# Patient Record
Sex: FEMALE | Race: BLACK OR AFRICAN AMERICAN | NOT HISPANIC OR LATINO
[De-identification: names, ages, dates, MRNs, and addresses within clinical notes are randomized per-mention and may not be internally consistent; named-entity substitution may affect disease eponyms.]

---

## 2019-07-31 ENCOUNTER — RESULT REVIEW (OUTPATIENT)
Age: 34
End: 2019-07-31

## 2020-06-09 ENCOUNTER — TRANSCRIPTION ENCOUNTER (OUTPATIENT)
Age: 35
End: 2020-06-09

## 2020-08-19 PROBLEM — Z00.00 ENCOUNTER FOR PREVENTIVE HEALTH EXAMINATION: Status: ACTIVE | Noted: 2020-08-19

## 2020-08-24 ENCOUNTER — APPOINTMENT (OUTPATIENT)
Dept: ANTEPARTUM | Facility: CLINIC | Age: 35
End: 2020-08-24
Payer: COMMERCIAL

## 2020-08-24 ENCOUNTER — ASOB RESULT (OUTPATIENT)
Age: 35
End: 2020-08-24

## 2020-08-24 PROCEDURE — 76811 OB US DETAILED SNGL FETUS: CPT

## 2020-09-22 ENCOUNTER — ASOB RESULT (OUTPATIENT)
Age: 35
End: 2020-09-22

## 2020-09-22 ENCOUNTER — APPOINTMENT (OUTPATIENT)
Dept: ANTEPARTUM | Facility: CLINIC | Age: 35
End: 2020-09-22
Payer: COMMERCIAL

## 2020-09-22 PROCEDURE — 76816 OB US FOLLOW-UP PER FETUS: CPT

## 2020-09-25 ENCOUNTER — APPOINTMENT (OUTPATIENT)
Dept: ANTEPARTUM | Facility: CLINIC | Age: 35
End: 2020-09-25

## 2020-10-27 ENCOUNTER — ASOB RESULT (OUTPATIENT)
Age: 35
End: 2020-10-27

## 2020-10-27 ENCOUNTER — APPOINTMENT (OUTPATIENT)
Dept: ANTEPARTUM | Facility: CLINIC | Age: 35
End: 2020-10-27
Payer: COMMERCIAL

## 2020-10-27 PROCEDURE — 76819 FETAL BIOPHYS PROFIL W/O NST: CPT

## 2020-10-27 PROCEDURE — 99072 ADDL SUPL MATRL&STAF TM PHE: CPT

## 2020-10-27 PROCEDURE — 76816 OB US FOLLOW-UP PER FETUS: CPT

## 2020-12-08 ENCOUNTER — ASOB RESULT (OUTPATIENT)
Age: 35
End: 2020-12-08

## 2020-12-08 ENCOUNTER — APPOINTMENT (OUTPATIENT)
Dept: ANTEPARTUM | Facility: CLINIC | Age: 35
End: 2020-12-08
Payer: COMMERCIAL

## 2020-12-08 PROCEDURE — 76816 OB US FOLLOW-UP PER FETUS: CPT

## 2020-12-08 PROCEDURE — 76819 FETAL BIOPHYS PROFIL W/O NST: CPT

## 2020-12-08 PROCEDURE — 99072 ADDL SUPL MATRL&STAF TM PHE: CPT

## 2021-01-19 ENCOUNTER — ASOB RESULT (OUTPATIENT)
Age: 36
End: 2021-01-19

## 2021-01-19 ENCOUNTER — APPOINTMENT (OUTPATIENT)
Dept: ANTEPARTUM | Facility: CLINIC | Age: 36
End: 2021-01-19
Payer: COMMERCIAL

## 2021-01-19 PROCEDURE — 76816 OB US FOLLOW-UP PER FETUS: CPT

## 2021-01-19 PROCEDURE — 76819 FETAL BIOPHYS PROFIL W/O NST: CPT

## 2021-01-19 PROCEDURE — 99072 ADDL SUPL MATRL&STAF TM PHE: CPT

## 2021-01-22 ENCOUNTER — APPOINTMENT (OUTPATIENT)
Dept: ANTEPARTUM | Facility: HOSPITAL | Age: 36
End: 2021-01-22

## 2021-01-22 ENCOUNTER — ASOB RESULT (OUTPATIENT)
Age: 36
End: 2021-01-22

## 2021-01-22 ENCOUNTER — APPOINTMENT (OUTPATIENT)
Dept: ANTEPARTUM | Facility: CLINIC | Age: 36
End: 2021-01-22
Payer: COMMERCIAL

## 2021-01-22 ENCOUNTER — OUTPATIENT (OUTPATIENT)
Dept: OUTPATIENT SERVICES | Facility: HOSPITAL | Age: 36
LOS: 1 days | End: 2021-01-22

## 2021-01-22 PROCEDURE — 76818 FETAL BIOPHYS PROFILE W/NST: CPT | Mod: 26

## 2021-01-24 ENCOUNTER — INPATIENT (INPATIENT)
Facility: HOSPITAL | Age: 36
LOS: 0 days | Discharge: HOME | End: 2021-01-24
Attending: OBSTETRICS & GYNECOLOGY | Admitting: OBSTETRICS & GYNECOLOGY

## 2021-01-24 ENCOUNTER — INPATIENT (INPATIENT)
Facility: HOSPITAL | Age: 36
LOS: 2 days | Discharge: HOME | End: 2021-01-27
Attending: OBSTETRICS & GYNECOLOGY | Admitting: OBSTETRICS & GYNECOLOGY

## 2021-01-24 VITALS
SYSTOLIC BLOOD PRESSURE: 124 MMHG | HEART RATE: 87 BPM | DIASTOLIC BLOOD PRESSURE: 78 MMHG | RESPIRATION RATE: 16 BRPM | HEIGHT: 65 IN | TEMPERATURE: 99 F | WEIGHT: 192.9 LBS

## 2021-01-24 DIAGNOSIS — O48.0 POST-TERM PREGNANCY: ICD-10-CM

## 2021-01-24 DIAGNOSIS — Z98.890 OTHER SPECIFIED POSTPROCEDURAL STATES: Chronic | ICD-10-CM

## 2021-01-24 DIAGNOSIS — O26.899 OTHER SPECIFIED PREGNANCY RELATED CONDITIONS, UNSPECIFIED TRIMESTER: ICD-10-CM

## 2021-01-24 DIAGNOSIS — Z3A.00 WEEKS OF GESTATION OF PREGNANCY NOT SPECIFIED: ICD-10-CM

## 2021-01-24 LAB
BASOPHILS # BLD AUTO: 0.02 K/UL — SIGNIFICANT CHANGE UP (ref 0–0.2)
BASOPHILS NFR BLD AUTO: 0.2 % — SIGNIFICANT CHANGE UP (ref 0–2)
EOSINOPHIL # BLD AUTO: 0.01 K/UL — SIGNIFICANT CHANGE UP (ref 0–0.5)
EOSINOPHIL NFR BLD AUTO: 0.1 % — SIGNIFICANT CHANGE UP (ref 0–6)
HCT VFR BLD CALC: 40.6 % — SIGNIFICANT CHANGE UP (ref 34.5–45)
HGB BLD-MCNC: 13.1 G/DL — SIGNIFICANT CHANGE UP (ref 11.5–15.5)
IANC: 8.43 K/UL — SIGNIFICANT CHANGE UP (ref 1.5–8.5)
IMM GRANULOCYTES NFR BLD AUTO: 0.9 % — SIGNIFICANT CHANGE UP (ref 0–1.5)
LYMPHOCYTES # BLD AUTO: 17.4 % — SIGNIFICANT CHANGE UP (ref 13–44)
LYMPHOCYTES # BLD AUTO: 2.02 K/UL — SIGNIFICANT CHANGE UP (ref 1–3.3)
MCHC RBC-ENTMCNC: 30.3 PG — SIGNIFICANT CHANGE UP (ref 27–34)
MCHC RBC-ENTMCNC: 32.3 GM/DL — SIGNIFICANT CHANGE UP (ref 32–36)
MCV RBC AUTO: 94 FL — SIGNIFICANT CHANGE UP (ref 80–100)
MONOCYTES # BLD AUTO: 1.04 K/UL — HIGH (ref 0–0.9)
MONOCYTES NFR BLD AUTO: 9 % — SIGNIFICANT CHANGE UP (ref 2–14)
NEUTROPHILS # BLD AUTO: 8.43 K/UL — HIGH (ref 1.8–7.4)
NEUTROPHILS NFR BLD AUTO: 72.4 % — SIGNIFICANT CHANGE UP (ref 43–77)
NRBC # BLD: 0 /100 WBCS — SIGNIFICANT CHANGE UP
NRBC # FLD: 0 K/UL — SIGNIFICANT CHANGE UP
PLATELET # BLD AUTO: 184 K/UL — SIGNIFICANT CHANGE UP (ref 150–400)
RBC # BLD: 4.32 M/UL — SIGNIFICANT CHANGE UP (ref 3.8–5.2)
RBC # FLD: 14.2 % — SIGNIFICANT CHANGE UP (ref 10.3–14.5)
WBC # BLD: 11.62 K/UL — HIGH (ref 3.8–10.5)
WBC # FLD AUTO: 11.62 K/UL — HIGH (ref 3.8–10.5)

## 2021-01-24 RX ORDER — OXYTOCIN 10 UNIT/ML
333.33 VIAL (ML) INJECTION
Qty: 20 | Refills: 0 | Status: DISCONTINUED | OUTPATIENT
Start: 2021-01-24 | End: 2021-01-26

## 2021-01-24 RX ORDER — INFLUENZA VIRUS VACCINE 15; 15; 15; 15 UG/.5ML; UG/.5ML; UG/.5ML; UG/.5ML
0.5 SUSPENSION INTRAMUSCULAR ONCE
Refills: 0 | Status: DISCONTINUED | OUTPATIENT
Start: 2021-01-24 | End: 2021-01-27

## 2021-01-24 RX ORDER — CITRIC ACID/SODIUM CITRATE 300-500 MG
15 SOLUTION, ORAL ORAL EVERY 6 HOURS
Refills: 0 | Status: DISCONTINUED | OUTPATIENT
Start: 2021-01-24 | End: 2021-01-26

## 2021-01-24 RX ORDER — SODIUM CHLORIDE 9 MG/ML
1000 INJECTION, SOLUTION INTRAVENOUS
Refills: 0 | Status: DISCONTINUED | OUTPATIENT
Start: 2021-01-24 | End: 2021-01-26

## 2021-01-24 NOTE — OB PROVIDER H&P - HISTORY OF PRESENT ILLNESS
34yo  @41w2d presents for scheduled LT IOL.   +FM. -VB. -LOF. -Ctx.    EFW: 3317  GBS: neg    OBHx:   GynHx: denies hx of fibroids, cysts, abnml pap smears, STDs  PMHx: denies  SHx: hernia repair in infancy, b/l benign lumpectomy ()  Meds: PNVs  All: denies  Psych: no hx of anxiety/depression

## 2021-01-24 NOTE — OB PROVIDER H&P - ASSESSMENT
36yo  @41w2d presents for scheduled LT IOL.  - admit to L&D  - routine labs  - COVID test  - IVF  - EFM, [place]  - IOL w/ PO cytotec    D/W Dr. Felicitas Tello, PGY-1

## 2021-01-24 NOTE — OB RN PATIENT PROFILE - NS PRO TDAP INDICATIONS_RESTRICTED
Incorrect: Patient ambulated to CT    Patient ambulated to bathroom.    pregnant & post-partum women during each pregancy irregardless of the patient's prior history of receiving Tdap to maximize the maternal antibody response and passive antibody transfer to the infant

## 2021-01-25 ENCOUNTER — APPOINTMENT (OUTPATIENT)
Dept: ANTEPARTUM | Facility: CLINIC | Age: 36
End: 2021-01-25

## 2021-01-25 ENCOUNTER — TRANSCRIPTION ENCOUNTER (OUTPATIENT)
Age: 36
End: 2021-01-25

## 2021-01-25 LAB
BLD GP AB SCN SERPL QL: NEGATIVE — SIGNIFICANT CHANGE UP
RH IG SCN BLD-IMP: POSITIVE — SIGNIFICANT CHANGE UP
RH IG SCN BLD-IMP: POSITIVE — SIGNIFICANT CHANGE UP
SARS-COV-2 IGG SERPL QL IA: NEGATIVE — SIGNIFICANT CHANGE UP
SARS-COV-2 IGM SERPL IA-ACNC: <0.1 INDEX — SIGNIFICANT CHANGE UP
SARS-COV-2 RNA SPEC QL NAA+PROBE: SIGNIFICANT CHANGE UP

## 2021-01-25 RX ORDER — LANOLIN
1 OINTMENT (GRAM) TOPICAL EVERY 6 HOURS
Refills: 0 | Status: DISCONTINUED | OUTPATIENT
Start: 2021-01-25 | End: 2021-01-27

## 2021-01-25 RX ORDER — DIPHENHYDRAMINE HCL 50 MG
25 CAPSULE ORAL EVERY 6 HOURS
Refills: 0 | Status: DISCONTINUED | OUTPATIENT
Start: 2021-01-25 | End: 2021-01-27

## 2021-01-25 RX ORDER — OXYTOCIN 10 UNIT/ML
333.33 VIAL (ML) INJECTION
Qty: 20 | Refills: 0 | Status: DISCONTINUED | OUTPATIENT
Start: 2021-01-25 | End: 2021-01-26

## 2021-01-25 RX ORDER — IBUPROFEN 200 MG
600 TABLET ORAL EVERY 6 HOURS
Refills: 0 | Status: COMPLETED | OUTPATIENT
Start: 2021-01-25 | End: 2021-12-24

## 2021-01-25 RX ORDER — DOCUSATE SODIUM 100 MG
10 CAPSULE ORAL
Qty: 0 | Refills: 0 | DISCHARGE

## 2021-01-25 RX ORDER — BUTORPHANOL TARTRATE 2 MG/ML
2 INJECTION, SOLUTION INTRAMUSCULAR; INTRAVENOUS ONCE
Refills: 0 | Status: DISCONTINUED | OUTPATIENT
Start: 2021-01-25 | End: 2021-01-25

## 2021-01-25 RX ORDER — MAGNESIUM HYDROXIDE 400 MG/1
30 TABLET, CHEWABLE ORAL
Refills: 0 | Status: DISCONTINUED | OUTPATIENT
Start: 2021-01-25 | End: 2021-01-27

## 2021-01-25 RX ORDER — BENZOCAINE 10 %
1 GEL (GRAM) MUCOUS MEMBRANE EVERY 6 HOURS
Refills: 0 | Status: DISCONTINUED | OUTPATIENT
Start: 2021-01-25 | End: 2021-01-27

## 2021-01-25 RX ORDER — OXYCODONE HYDROCHLORIDE 5 MG/1
5 TABLET ORAL ONCE
Refills: 0 | Status: DISCONTINUED | OUTPATIENT
Start: 2021-01-25 | End: 2021-01-27

## 2021-01-25 RX ORDER — ACETAMINOPHEN 500 MG
975 TABLET ORAL
Refills: 0 | Status: DISCONTINUED | OUTPATIENT
Start: 2021-01-25 | End: 2021-01-27

## 2021-01-25 RX ORDER — HYDROCORTISONE 1 %
1 OINTMENT (GRAM) TOPICAL EVERY 6 HOURS
Refills: 0 | Status: DISCONTINUED | OUTPATIENT
Start: 2021-01-25 | End: 2021-01-27

## 2021-01-25 RX ORDER — PRAMOXINE HYDROCHLORIDE 150 MG/15G
1 AEROSOL, FOAM RECTAL EVERY 4 HOURS
Refills: 0 | Status: DISCONTINUED | OUTPATIENT
Start: 2021-01-25 | End: 2021-01-27

## 2021-01-25 RX ORDER — DIBUCAINE 1 %
1 OINTMENT (GRAM) RECTAL EVERY 6 HOURS
Refills: 0 | Status: DISCONTINUED | OUTPATIENT
Start: 2021-01-25 | End: 2021-01-27

## 2021-01-25 RX ORDER — OXYCODONE HYDROCHLORIDE 5 MG/1
5 TABLET ORAL
Refills: 0 | Status: DISCONTINUED | OUTPATIENT
Start: 2021-01-25 | End: 2021-01-27

## 2021-01-25 RX ORDER — KETOROLAC TROMETHAMINE 30 MG/ML
30 SYRINGE (ML) INJECTION ONCE
Refills: 0 | Status: DISCONTINUED | OUTPATIENT
Start: 2021-01-25 | End: 2021-01-25

## 2021-01-25 RX ORDER — TETANUS TOXOID, REDUCED DIPHTHERIA TOXOID AND ACELLULAR PERTUSSIS VACCINE, ADSORBED 5; 2.5; 8; 8; 2.5 [IU]/.5ML; [IU]/.5ML; UG/.5ML; UG/.5ML; UG/.5ML
0.5 SUSPENSION INTRAMUSCULAR ONCE
Refills: 0 | Status: DISCONTINUED | OUTPATIENT
Start: 2021-01-25 | End: 2021-01-27

## 2021-01-25 RX ORDER — AER TRAVELER 0.5 G/1
1 SOLUTION RECTAL; TOPICAL EVERY 4 HOURS
Refills: 0 | Status: DISCONTINUED | OUTPATIENT
Start: 2021-01-25 | End: 2021-01-27

## 2021-01-25 RX ORDER — OXYTOCIN 10 UNIT/ML
2 VIAL (ML) INJECTION
Qty: 30 | Refills: 0 | Status: DISCONTINUED | OUTPATIENT
Start: 2021-01-25 | End: 2021-01-26

## 2021-01-25 RX ORDER — SIMETHICONE 80 MG/1
80 TABLET, CHEWABLE ORAL EVERY 4 HOURS
Refills: 0 | Status: DISCONTINUED | OUTPATIENT
Start: 2021-01-25 | End: 2021-01-27

## 2021-01-25 RX ORDER — SODIUM CHLORIDE 9 MG/ML
3 INJECTION INTRAMUSCULAR; INTRAVENOUS; SUBCUTANEOUS EVERY 8 HOURS
Refills: 0 | Status: DISCONTINUED | OUTPATIENT
Start: 2021-01-25 | End: 2021-01-27

## 2021-01-25 RX ORDER — VALACYCLOVIR 500 MG/1
1 TABLET, FILM COATED ORAL
Qty: 0 | Refills: 0 | DISCHARGE

## 2021-01-25 RX ORDER — BUTORPHANOL TARTRATE 2 MG/ML
2 INJECTION, SOLUTION INTRAMUSCULAR; INTRAVENOUS ONCE
Refills: 0 | Status: DISCONTINUED | OUTPATIENT
Start: 2021-01-25 | End: 2021-01-27

## 2021-01-25 RX ADMIN — SODIUM CHLORIDE 125 MILLILITER(S): 9 INJECTION, SOLUTION INTRAVENOUS at 00:57

## 2021-01-25 RX ADMIN — Medication 1000 MILLIUNIT(S)/MIN: at 21:40

## 2021-01-25 RX ADMIN — Medication 2 MILLIUNIT(S)/MIN: at 19:04

## 2021-01-25 RX ADMIN — BUTORPHANOL TARTRATE 2 MILLIGRAM(S): 2 INJECTION, SOLUTION INTRAMUSCULAR; INTRAVENOUS at 07:53

## 2021-01-25 RX ADMIN — Medication 30 MILLIGRAM(S): at 23:01

## 2021-01-25 NOTE — OB PROVIDER DELIVERY SUMMARY - NSPROVIDERDELIVERYNOTE_OBGYN_ALL_OB_FT
over second degree laceration rom DAVI ,baby male apgar 9/9, 7lb3oz,head delivered atraumatically so shoulders and rest of the body,cord clumped and cut after delayed clumping, gases drawn, placenta delivered intact spontaneously, laceration  repaired in usual fashion with 2-0 chromic,ebl 300 ml,peds present  after delivery mother and baby in stable condition.  over second degree laceration rom DAVI ,baby male apgar 9/9, 7lb3oz,head delivered atraumatically so shoulders and rest of the body,cord clumped and cut after delayed clumping, gases drawn, placenta delivered intact spontaneously, laceration  repaired in usual fashion with 2-0 chromic,ebl 300 ml,peds present  after delivery mother and baby in stable condition.rectum checked  intact no stiches.

## 2021-01-25 NOTE — OB PROVIDER LABOR PROGRESS NOTE - ASSESSMENT
Plan: 36 y/o  @41w3d in stable condition  - Con't IOL w/ PO cytotec, CB in place  - Continuous EFM, [place]  - Con't IVF    D/W attending physician Dr. Felicitas Tello MD  PGY-1

## 2021-01-25 NOTE — PROGRESS NOTE ADULT - SUBJECTIVE AND OBJECTIVE BOX
rafaela seen and evalauted  fhr cat 1  toco ctx q 2-3 min  sve fully/1.+2  feels urge to push  we will start pushing  anticipate

## 2021-01-25 NOTE — OB RN DELIVERY SUMMARY - NS_SEPSISRSKCALC_OBGYN_ALL_OB_FT
EOS calculated successfully. EOS Risk Factor: 0.5/1000 live births ([facility] national incidence); GA=41w3d; Temp=99.14; ROM=6.9; GBS='Negative'; Antibiotics='No antibiotics or any antibiotics < 2 hrs prior to birth'

## 2021-01-25 NOTE — DISCHARGE NOTE OB - MEDICATION SUMMARY - MEDICATIONS TO TAKE
I will START or STAY ON the medications listed below when I get home from the hospital:    Prenatal 1 oral capsule  -- Indication: For Post term pregnancy   I will START or STAY ON the medications listed below when I get home from the hospital:    acetaminophen 325 mg oral tablet  -- 3 tab(s) by mouth   -- Indication: For Pain, as needed    ibuprofen 600 mg oral tablet  -- 1 tab(s) by mouth every 6 hours  -- Indication: For Pain, as needed    Prenatal 1 oral capsule  -- Indication: For supplement

## 2021-01-25 NOTE — DISCHARGE NOTE OB - CARE PLAN
Principal Discharge DX:	Vaginal delivery  Goal:	fullrecovery  Assessment and plan of treatment:	s/p ,encouarge ambulataion and breastfeeding,fup in 6 wks

## 2021-01-25 NOTE — PROGRESS NOTE ADULT - SUBJECTIVE AND OBJECTIVE BOX
patient seen and evaluated  feel comfortable  fhr cat 1  toco irregular ctx  a/p: 34 y/o p0 at 41 wks+    admitted for elective iol   on cytotec and cervical balloon   continue with current management    pain meds prn(morphine or stadol)    epidural prn    anticipate

## 2021-01-25 NOTE — OB NEONATOLOGY/PEDIATRICIAN DELIVERY SUMMARY - NSPEDSNEONOTESA_OBGYN_ALL_OB_FT
Baby is a 41.3 wk GA male born to a 34 y/o  mother via . Peds called for meconium.  Maternal history negative.  Prenatal history uncomplicated. Maternal BT O positive.  PNL neg, NR, and immune. GBS neg on .  SROM at 1430 on  mec fluids. Baby born vigorous and crying spontaneously. WDSS. Apgars 9/9. EOS 0.24.  Mom plans to breastfeed and bottlefeed, declines Hepatitis B, would like circ.  COVID status negative x 2.

## 2021-01-25 NOTE — OB PROVIDER LABOR PROGRESS NOTE - NS_SUBJECTIVE/OBJECTIVE_OBGYN_ALL_OB_FT
PGY1 Labor & Delivery Progress Note     Pt seen & examined at bedside. Cervical balloon placed without incidence.    T(C): 36.5 (01-25-21 @ 05:08), Max: 37.3 (01-24-21 @ 21:04)  HR: 72 (01-25-21 @ 05:12) (72 - 96)  BP: 112/55 (01-25-21 @ 05:12) (107/57 - 128/74)  RR: 16 (01-24-21 @ 21:04) (16 - 16)  SpO2: --

## 2021-01-25 NOTE — DISCHARGE NOTE OB - CARE PROVIDER_API CALL
Kohanim, Behnam  OBSTETRICS AND GYNECOLOGY  [address], [address]  [place] [address]  Phone: [phone]  Fax: [phone]  Follow Up Time:

## 2021-01-26 RX ORDER — ACETAMINOPHEN 500 MG
3 TABLET ORAL
Qty: 0 | Refills: 0 | DISCHARGE
Start: 2021-01-26

## 2021-01-26 RX ORDER — IBUPROFEN 200 MG
1 TABLET ORAL
Qty: 0 | Refills: 0 | DISCHARGE
Start: 2021-01-26

## 2021-01-26 RX ORDER — IBUPROFEN 200 MG
600 TABLET ORAL EVERY 6 HOURS
Refills: 0 | Status: DISCONTINUED | OUTPATIENT
Start: 2021-01-26 | End: 2021-01-27

## 2021-01-26 RX ADMIN — Medication 600 MILLIGRAM(S): at 22:00

## 2021-01-26 RX ADMIN — Medication 975 MILLIGRAM(S): at 13:03

## 2021-01-26 RX ADMIN — Medication 600 MILLIGRAM(S): at 10:15

## 2021-01-26 RX ADMIN — Medication 975 MILLIGRAM(S): at 23:56

## 2021-01-26 RX ADMIN — Medication 600 MILLIGRAM(S): at 16:00

## 2021-01-26 RX ADMIN — Medication 975 MILLIGRAM(S): at 18:12

## 2021-01-26 RX ADMIN — Medication 975 MILLIGRAM(S): at 01:09

## 2021-01-26 RX ADMIN — SODIUM CHLORIDE 3 MILLILITER(S): 9 INJECTION INTRAMUSCULAR; INTRAVENOUS; SUBCUTANEOUS at 02:34

## 2021-01-26 RX ADMIN — Medication 1 TABLET(S): at 13:02

## 2021-01-26 RX ADMIN — Medication 975 MILLIGRAM(S): at 06:12

## 2021-01-26 NOTE — CHART NOTE - NSCHARTNOTEFT_GEN_A_CORE
Attending Note    Patient evaluated at bedside. Patient offers no complaints. lochia moderate, breast and bottle feeding. No dysuria. Tolerating diet, ambulating. No acute events overnight.    VS T 97.5  P 86  R 18  BP 98883    heent nl  Abd soft fundus firm  ext no CCE  neuro AAox 3  COVID-19 neg on 1/24    A'; PPD#1 s/pNSVD stable  P: DC home in am 1/27     Discharge instructions reviewed    Rj
NP note    Pt seen for examination sp epidural.     T(C): 36.7 (2021 13:03), Max: 37.3 (2021 21:04)  T(F): 98.06 (2021 13:03), Max: 99.14 (2021 21:40)  HR: 77 (2021 17:28) (65 - 115)  BP: 116/65 (2021 17:24) (107/57 - 137/75)  RR: 16 (2021 21:04) (16 - 16)  SpO2: 87% (2021 17:28) (74% - 100%)  /mod frandy/+ accels/no decels  [place] q2-3min irregular on IUPC   SVE 5/70/-2  Heavy mec AROM forebag  IUPC placed without incident. Pt tolerated well.     36 y/o  @41+3w LTIOL    -Pitocin to be started as per Dr. Meza (Dr Hargrove covering)    KASSY cortez
NP note    Pt seen for increased pain    T(C): 36.5 (25 Jan 2021 09:00), Max: 37.3 (24 Jan 2021 21:04)  T(F): 97.7 (25 Jan 2021 09:00), Max: 99.14 (24 Jan 2021 21:40)  HR: 75 (25 Jan 2021 14:27) (65 - 107)  BP: 132/67 (25 Jan 2021 14:27) (107/57 - 137/75)  RR: 16 (24 Jan 2021 21:04) (16 - 16)  SpO2: 100% (25 Jan 2021 14:25) (89% - 100%)  EFM  130/mod frandy/+ accels/no decels  [place] q4-5min  SVE 4-5/70/-3  cervical balloon is out    Approved for epidural  Transfer to LDR  Re-examine after epidural  DW Dr. Derrick Barbosa covering    dana, NP
NP note    Pt seen for increased pain.     T(C): 36.5 (2021 05:08), Max: 37.3 (2021 21:04)  T(F): 97.7 (2021 05:08), Max: 99.14 (2021 21:40)  HR: 95 (2021 08:00) (70 - 96)  BP: 127/79 (2021 07:59) (107/57 - 131/76)  RR: 16 (2021 21:04) (16 - 16)  SpO2: 100% (2021 07:55) (89% - 100%)  EFM Cat I   [place] irregular  CB tightly in place    36 y/o  @41+3 LTIOL     -Continue PO cytotec  -Maintain cervical balloon  -Stadol 2mg IVP approved by Dr. Derrick cortez, NP

## 2021-01-26 NOTE — PROGRESS NOTE ADULT - ASSESSMENT
ANESTHESIA POST-EPIDURAL CHECK    35y Female s/p  DAY 1     No COMPLAINTS    NO APPARENT ANESTHESIA COMPLICATIONS

## 2021-01-27 VITALS
OXYGEN SATURATION: 100 % | TEMPERATURE: 98 F | RESPIRATION RATE: 16 BRPM | DIASTOLIC BLOOD PRESSURE: 59 MMHG | SYSTOLIC BLOOD PRESSURE: 114 MMHG | HEART RATE: 96 BPM

## 2021-01-27 LAB — T PALLIDUM AB TITR SER: NEGATIVE — SIGNIFICANT CHANGE UP

## 2021-01-27 RX ADMIN — Medication 975 MILLIGRAM(S): at 06:52

## 2021-01-27 RX ADMIN — Medication 975 MILLIGRAM(S): at 12:33

## 2021-01-27 RX ADMIN — Medication 600 MILLIGRAM(S): at 10:00

## 2021-01-27 RX ADMIN — Medication 600 MILLIGRAM(S): at 04:16

## 2021-01-27 NOTE — LACTATION INITIAL EVALUATION - LACTATION INTERVENTIONS
Reviewed with patient risks of supplementation.  Patient stated she will both breast and formula feed at home./initiate skin to skin/initiate hand expression routine/initiate/review early breastfeeding management guidelines/initiate/review techniques for position and latch/post discharge community resources provided/review techniques to increase milk supply/initiate/review breast massage/compression

## 2021-02-11 DIAGNOSIS — O48.0 POST-TERM PREGNANCY: ICD-10-CM

## 2021-02-11 DIAGNOSIS — O28.1 ABNORMAL BIOCHEMICAL FINDING ON ANTENATAL SCREENING OF MOTHER: ICD-10-CM

## 2021-02-11 DIAGNOSIS — Z3A.40 40 WEEKS GESTATION OF PREGNANCY: ICD-10-CM

## 2021-06-08 NOTE — OB RN DELIVERY SUMMARY - NS_RNDELIVATTEST_OBGYN_ALL_OB
OB Provider reported that the vagina was inspected and no foreign body was found/Laps, needles and instrument count was correct
yes...

## 2022-06-22 NOTE — OB RN DELIVERY SUMMARY - BABY A: APGAR 5 MIN REFLEX IRRITABILITY, DELIVERY
(2) cough or sneeze
Quality 431: Preventive Care And Screening: Unhealthy Alcohol Use - Screening: Patient not identified as an unhealthy alcohol user when screened for unhealthy alcohol use using a systematic screening method
Detail Level: Detailed
Quality 110: Preventive Care And Screening: Influenza Immunization: Influenza Immunization Administered during Influenza season
Quality 226: Preventive Care And Screening: Tobacco Use: Screening And Cessation Intervention: Patient screened for tobacco use and is an ex/non-smoker
Quality 130: Documentation Of Current Medications In The Medical Record: Current Medications Documented

## 2023-06-16 NOTE — OB RN PATIENT PROFILE - BREAST MILK PROVIDES PROTECTION AGAINST INFECTION
-- DO NOT REPLY / DO NOT REPLY ALL --  -- Message is from Engagement Center Operations ([facility]) --    General Patient Message: Returning missed call. Please call back.     Caller Information       Type Contact Phone/Fax    06/15/2023 03:48 PM CDT Phone (Incoming) José Miguel Acuña R (Self) [phone] (M)    06/16/2023 10:11 AM CDT Phone (Outgoing) José Miguel Acuña R (Self) [phone] (M)    Left Message     06/16/2023 03:27 PM CDT Phone (Incoming) José Miguel Acuña R (Self) [phone] (M)    06/16/2023 03:46 PM CDT Phone (Outgoing) José Miguel Acuña R (Self) [phone] (M)    Left Message     06/16/2023 05:00 PM CDT Phone (Incoming) José Miguel Acuña R (Self) [phone] (M)        Alternative phone number: NO    Can a detailed message be left? Yes    Message Turnaround: [place]:    Refer to site's KB page for routing instructions    Please give this turnaround time to the caller:   \"You can expect to receive a response 2-3 business days after your provider's clinical team reviews the message\"               Statement Selected